# Patient Record
Sex: MALE | Race: BLACK OR AFRICAN AMERICAN | NOT HISPANIC OR LATINO | Employment: FULL TIME | URBAN - METROPOLITAN AREA
[De-identification: names, ages, dates, MRNs, and addresses within clinical notes are randomized per-mention and may not be internally consistent; named-entity substitution may affect disease eponyms.]

---
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
## 2024-02-05 ENCOUNTER — HOSPITAL ENCOUNTER (EMERGENCY)
Facility: HOSPITAL | Age: 19
Discharge: HOME | End: 2024-02-05
Attending: STUDENT IN AN ORGANIZED HEALTH CARE EDUCATION/TRAINING PROGRAM
Payer: MEDICAID

## 2024-02-05 VITALS
BODY MASS INDEX: 30.2 KG/M2 | TEMPERATURE: 98 F | HEIGHT: 72 IN | WEIGHT: 223 LBS | DIASTOLIC BLOOD PRESSURE: 58 MMHG | RESPIRATION RATE: 18 BRPM | OXYGEN SATURATION: 98 % | HEART RATE: 63 BPM | SYSTOLIC BLOOD PRESSURE: 121 MMHG

## 2024-02-05 DIAGNOSIS — R59.1 LYMPHADENOPATHY: Primary | ICD-10-CM

## 2024-02-05 PROCEDURE — 99283 EMERGENCY DEPT VISIT LOW MDM: CPT

## 2024-02-05 PROCEDURE — 25000003 PHARM REV CODE 250

## 2024-02-05 RX ORDER — IBUPROFEN 400 MG/1
400 TABLET ORAL
Status: COMPLETED | OUTPATIENT
Start: 2024-02-05 | End: 2024-02-05

## 2024-02-05 RX ADMIN — IBUPROFEN 400 MG: 400 TABLET ORAL at 06:02

## 2024-02-05 NOTE — FIRST PROVIDER EVALUATION
Medical screening examination initiated.  I have conducted a focused provider triage encounter, findings are as follows:     Brief history of present illness:  lump x 2 days on side of neck     Vitals:    02/05/24 1637   TempSrc: Oral   Weight: 101.2 kg (223 lb)   Height: 6' (1.829 m)     Pertinent physical exam:  ambulatory     Brief workup plan:  intake     Preliminary workup initiated; this workup will be continued and followed by the physician or advanced practice provider that is assigned to the patient when roomed.

## 2024-02-05 NOTE — ED PROVIDER NOTES
Encounter Date: 2/5/2024       History     Chief Complaint   Patient presents with    Mass     Lump to L side of face, here earlier and accidentally taken out by reg     The history is provided by the patient.     Mr. Chandler is a 18-year-old male with no significant past medical history who presents due to a small mass on the left side of his neck just inferior to his ear.  He states that it popped up approximately 48 hours ago, and that it is not painful.  His mother told him to present to the emergency department to have it evaluated.  Swallowing or head movements do not affect the mass, and he states that he has not taken any medications to try and reduce the mass.  Prior to the onset of the mass/throughout the past 48 hours he denies any symptoms including fever, chills, nausea, coughing, vomiting, diarrhea or any change with bowel movements or urination.    Review of patient's allergies indicates:  No Known Allergies  No past medical history on file.  No past surgical history on file.  No family history on file.  Social History     Tobacco Use    Smoking status: Never    Smokeless tobacco: Never       Physical Exam     Initial Vitals [02/05/24 1637]   BP Pulse Resp Temp SpO2   (!) 121/58 63 18 98.1 °F (36.7 °C) 98 %      MAP       --         Physical Exam    Nursing note and vitals reviewed.  Constitutional: He appears well-developed. No distress.   HENT:   Head: Normocephalic and atraumatic.   Mouth/Throat: Oropharynx is clear and moist and mucous membranes are normal.   No lesions noted in the oropharynx   Eyes: EOM are normal. Pupils are equal, round, and reactive to light.   Neck:   A small 1 cm swollen lymph node is noted in the left mandible, just inferior to the ear.  No other lymphadenopathy is noted throughout the patient's neck or jaw line.   Normal range of motion.  Cardiovascular:  Normal rate.           Pulmonary/Chest: No respiratory distress.   Abdominal: He exhibits no distension.    Musculoskeletal:         General: No edema.      Cervical back: Normal range of motion.     Neurological: He is alert and oriented to person, place, and time.   Skin: Skin is warm.   Psychiatric: He has a normal mood and affect. His behavior is normal. Thought content normal.         ED Course   Procedures  Labs Reviewed - No data to display       Imaging Results    None          Medications   ibuprofen tablet 400 mg (400 mg Oral Given 2/5/24 1801)     Medical Decision Making  Mr. Chandler is a 18-year-old male who presents due to a swollen lymph node.  Upon my initial evaluation of the patient, he has vital stable signs and an overall reassuring physical exam. There is no other lymphadenopathy noted in the patient's exam. I believe the lymphadenopathy At this time, I do not feel as if there is any emergent pathologies potentially affecting the patient, and we will give a 400 Mag dose of Motrin to try and reduce any inflammation/pain. Discussion had with the patient regarding the importance of follow up with his primary care doctor to monitor the lymphadenopathy.  Patient verbalizes understanding and is in agreement with the plan.  Stable for discharge at this time.    Amount and/or Complexity of Data Reviewed  External Data Reviewed: notes.     Details: Previous medical records were reviewed to better understand the patient's past medical history, specifically from his most recent office visit with his PCP in 1/2024    Risk  OTC drugs.                                      Clinical Impression:  Final diagnoses:  [R59.1] Lymphadenopathy (Primary)          ED Disposition Condition    Discharge           ED Prescriptions    None       Follow-up Information       Follow up With Specialties Details Why Contact Info    Chioma Traylor MD Pediatrics In 1 week To check the status of the lymphadenopathy [id number] [facility] [id number] [phone]               Stefan Tamez MD  Resident  02/05/24  1906

## 2024-02-06 NOTE — DISCHARGE INSTRUCTIONS
You were found to have a swollen lymph node.  Although it is not hurting you right now, please follow up with your primary care physician to make sure that this goes away.  If you develop any new symptoms such as worsening pain or any other changes that are alarming, please return to the emergency department for further workup.